# Patient Record
(demographics unavailable — no encounter records)

---

## 2024-10-21 NOTE — PHYSICAL EXAM
[Erythematous Oropharynx] : nonerythematous oropharynx [NL] : no abnormal lymph nodes palpated [de-identified] : macular rash with blanching no vesicles below eyes over nasal bridge, RT cheek, and LT forehead, rash very circumscribed, appears almost like mask

## 2024-10-21 NOTE — HISTORY OF PRESENT ILLNESS
[FreeTextEntry6] : 3 y/o F BIB mother for concerns of facial rash for six days. Rash noted initially below eyes and nasal bridge but in last day has become prominent on forehead and LT cheek. No significant pruritus. No new exposures. Reports tolerating PO, no emesis, normal UOP, normal bowel movements. Afebrile. No other rash elsewhere on body. No other household contacts with rash. No URI symptoms, pt somewhat more tired yesterday.

## 2024-10-21 NOTE — DISCUSSION/SUMMARY
[FreeTextEntry1] : Possible contact dermatitis, etiology currently undetermined  Can apply OTC hydrocortisone area, avoid eyes Trial OTC antihistamine such as Zyrtec 2.5 BID Anticipatory guidance and education provided; seek care if symptoms persist or worsen, discussed signs concerning for infection Will f/u later this week, if no change consider derm eval

## 2024-10-24 NOTE — DISCUSSION/SUMMARY
[FreeTextEntry1] : Rapid strep negative, likely viral, given appearance of rash concern for possible parvovirus and discussed w/mother this concern. D/w parent precautions and supportive care.   F/u throat culture and treat accordingly Supportive care: Motrin/Tylenol for pain or fever, increase hydration Appropriate anticipatory guidance and education given; seek care if symptoms persist or worsen. D/w parent seek care if unable to tolerate PO or decreased UOP.

## 2024-10-24 NOTE — PHYSICAL EXAM
[Tired appearing] : tired appearing [Erythematous Oropharynx] : erythematous oropharynx [Enlarged Tonsils] : enlarged tonsils [Enlarged] : enlarged [Posterior Cervical] : posterior cervical [NL] : moves all extremities x4, warm, well perfused x4 [FreeTextEntry3] : myringotomy tubes in place b/l [de-identified] : slight redness to cheeks b/l and blanching, flat, lacy like erythematous rash on RT UE